# Patient Record
Sex: FEMALE | Race: WHITE | Employment: STUDENT | URBAN - NONMETROPOLITAN AREA
[De-identification: names, ages, dates, MRNs, and addresses within clinical notes are randomized per-mention and may not be internally consistent; named-entity substitution may affect disease eponyms.]

---
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
## 2024-06-04 ENCOUNTER — LAB ENCOUNTER (OUTPATIENT)
Dept: LAB | Age: 19
End: 2024-06-04
Attending: FAMILY MEDICINE
Payer: COMMERCIAL

## 2024-06-04 ENCOUNTER — OFFICE VISIT (OUTPATIENT)
Dept: FAMILY MEDICINE CLINIC | Facility: CLINIC | Age: 19
End: 2024-06-04
Payer: COMMERCIAL

## 2024-06-04 VITALS
RESPIRATION RATE: 16 BRPM | SYSTOLIC BLOOD PRESSURE: 110 MMHG | HEIGHT: 65.25 IN | HEART RATE: 80 BPM | TEMPERATURE: 98 F | DIASTOLIC BLOOD PRESSURE: 70 MMHG | BODY MASS INDEX: 23.04 KG/M2 | WEIGHT: 140 LBS | OXYGEN SATURATION: 98 %

## 2024-06-04 DIAGNOSIS — F41.1 GENERALIZED ANXIETY DISORDER: ICD-10-CM

## 2024-06-04 DIAGNOSIS — Z00.00 ROUTINE HEALTH MAINTENANCE: ICD-10-CM

## 2024-06-04 DIAGNOSIS — Z00.00 ROUTINE HEALTH MAINTENANCE: Primary | ICD-10-CM

## 2024-06-04 LAB
ALBUMIN SERPL-MCNC: 4.5 G/DL (ref 3.4–5)
ALBUMIN/GLOB SERPL: 1.6 {RATIO} (ref 1–2)
ALP LIVER SERPL-CCNC: 93 U/L
ALT SERPL-CCNC: 21 U/L
ANION GAP SERPL CALC-SCNC: 6 MMOL/L (ref 0–18)
AST SERPL-CCNC: 22 U/L (ref 15–37)
BASOPHILS # BLD AUTO: 0.05 X10(3) UL (ref 0–0.2)
BASOPHILS NFR BLD AUTO: 0.8 %
BILIRUB SERPL-MCNC: 0.9 MG/DL (ref 0.1–2)
BUN BLD-MCNC: 7 MG/DL (ref 9–23)
CALCIUM BLD-MCNC: 9.7 MG/DL (ref 8.5–10.1)
CHLORIDE SERPL-SCNC: 109 MMOL/L (ref 98–112)
CHOLEST SERPL-MCNC: 135 MG/DL (ref ?–200)
CO2 SERPL-SCNC: 25 MMOL/L (ref 21–32)
CREAT BLD-MCNC: 0.9 MG/DL
EGFRCR SERPLBLD CKD-EPI 2021: 94 ML/MIN/1.73M2 (ref 60–?)
EOSINOPHIL # BLD AUTO: 0.07 X10(3) UL (ref 0–0.7)
EOSINOPHIL NFR BLD AUTO: 1.2 %
ERYTHROCYTE [DISTWIDTH] IN BLOOD BY AUTOMATED COUNT: 11.9 %
FASTING PATIENT LIPID ANSWER: YES
FASTING STATUS PATIENT QL REPORTED: YES
GLOBULIN PLAS-MCNC: 2.8 G/DL (ref 2.8–4.4)
GLUCOSE BLD-MCNC: 83 MG/DL (ref 70–99)
HCT VFR BLD AUTO: 38 %
HDLC SERPL-MCNC: 55 MG/DL (ref 40–59)
HGB BLD-MCNC: 12.8 G/DL
IMM GRANULOCYTES # BLD AUTO: 0.02 X10(3) UL (ref 0–1)
IMM GRANULOCYTES NFR BLD: 0.3 %
LDLC SERPL CALC-MCNC: 69 MG/DL (ref ?–100)
LYMPHOCYTES # BLD AUTO: 1.6 X10(3) UL (ref 1.5–5)
LYMPHOCYTES NFR BLD AUTO: 26.7 %
MCH RBC QN AUTO: 31.4 PG (ref 26–34)
MCHC RBC AUTO-ENTMCNC: 33.7 G/DL (ref 31–37)
MCV RBC AUTO: 93.1 FL
MONOCYTES # BLD AUTO: 0.38 X10(3) UL (ref 0.1–1)
MONOCYTES NFR BLD AUTO: 6.3 %
NEUTROPHILS # BLD AUTO: 3.87 X10 (3) UL (ref 1.5–7.7)
NEUTROPHILS # BLD AUTO: 3.87 X10(3) UL (ref 1.5–7.7)
NEUTROPHILS NFR BLD AUTO: 64.7 %
NONHDLC SERPL-MCNC: 80 MG/DL (ref ?–130)
OSMOLALITY SERPL CALC.SUM OF ELEC: 287 MOSM/KG (ref 275–295)
PLATELET # BLD AUTO: 286 10(3)UL (ref 150–450)
POTASSIUM SERPL-SCNC: 3.8 MMOL/L (ref 3.5–5.1)
PROT SERPL-MCNC: 7.3 G/DL (ref 6.4–8.2)
RBC # BLD AUTO: 4.08 X10(6)UL
SODIUM SERPL-SCNC: 140 MMOL/L (ref 136–145)
T4 FREE SERPL-MCNC: 1 NG/DL (ref 0.9–1.6)
TRIGL SERPL-MCNC: 49 MG/DL (ref 30–149)
TSI SER-ACNC: 2.27 MIU/ML (ref 0.36–3.74)
VLDLC SERPL CALC-MCNC: 7 MG/DL (ref 0–30)
WBC # BLD AUTO: 6 X10(3) UL (ref 4–11)

## 2024-06-04 PROCEDURE — 85025 COMPLETE CBC W/AUTO DIFF WBC: CPT

## 2024-06-04 PROCEDURE — 36415 COLL VENOUS BLD VENIPUNCTURE: CPT

## 2024-06-04 PROCEDURE — 80053 COMPREHEN METABOLIC PANEL: CPT

## 2024-06-04 PROCEDURE — 84439 ASSAY OF FREE THYROXINE: CPT

## 2024-06-04 PROCEDURE — 80061 LIPID PANEL: CPT

## 2024-06-04 PROCEDURE — 99395 PREV VISIT EST AGE 18-39: CPT | Performed by: FAMILY MEDICINE

## 2024-06-04 PROCEDURE — 84443 ASSAY THYROID STIM HORMONE: CPT

## 2024-06-04 NOTE — PROGRESS NOTES
Subjective:   Shabnam Roy is a 18 year old female who was brought in for her Physical (Discuss anxiety) visit.    History was provided by patient   Pt c/o anxiety / ? Panic attacks  W/o depression  W/o concerns hurting self / others    History/Other:     She  has a past medical history of Anxiety.   She  has a past surgical history that includes other surgical history (N/A, 1/26/2016).  Her family history is not on file.  She currently has no medications in their medication list.    Chief Complaint Reviewed and Verified  Nursing Notes Reviewed and   Verified  Tobacco Reviewed  Medications Reviewed  Medical History   Reviewed  Surgical History Reviewed  Family History Reviewed  Social   History Reviewed               PHQ-2 SCORE: 0  , done 6/4/2024       TB Screening Needed? : No    Review of Systems  As documented in HPI    Child/teen diet: varied diet and drinks milk and water     Elimination: no concerns    Sleep: no concerns and sleeps well     Dental: normal for age    Development:  Current grade level:  College  School performance/Grades: doing well in school  Sports/Activities:  Counseled on targeting 60+ minutes of moderate (or higher) intensity activity daily  She  reports that she has never smoked. She has never used smokeless tobacco. She reports that she does not drink alcohol and does not use drugs.      Sexual activity: no    Periods reg / mod       Objective:   Blood pressure 110/70, pulse 80, temperature 98.2 °F (36.8 °C), temperature source Temporal, resp. rate 16, height 5' 5.25\" (1.657 m), weight 140 lb (63.5 kg), SpO2 98%.   BMI for age is 65.39%.  Physical Exam      Constitutional: appears well hydrated, alert and responsive, no acute distress noted  Head/Face: Normocephalic, atraumatic  Eye:Pupils equal, round, reactive to light, red reflex present bilaterally, and tracks symmetrically  Vision: screen not needed   Ears/Hearing: normal shape and position  ear canal and TM normal  bilaterally  Nose: nares normal, no discharge  Mouth/Throat: oropharynx is normal, mucus membranes are moist  no oral lesions or erythema  Neck/Thyroid: supple, no lymphadenopathy   Breast Exam : deferred   Respiratory: normal to inspection, clear to auscultation bilaterally   Cardiovascular: regular rate and rhythm, no murmur  Vascular: well perfused and peripheral pulses equal  Abdomen:non distended, normal bowel sounds, no hepatosplenomegaly, no masses  Genitourinary: deferred  Skin/Hair: no rash, no abnormal bruising  Back/Spine: no abnormalities and no scoliosis  Musculoskeletal: no deformities, full ROM of all extremities  Extremities: no deformities, pulses equal upper and lower extremities  Neurologic: exam appropriate for age, reflexes grossly normal for age, and motor skills grossly normal for age  Psychiatric: behavior appropriate for age    Assessment & Plan:   There are no diagnoses linked to this encounter.    Immunizations discussed, No vaccines ordered today.      Parental concerns and questions addressed.  Anticipatory guidance for nutrition/diet, exercise/physical activity, safety and development discussed and reviewed.  Brian Developmental Handout provided  Counseling : healthy diet with adequate calcium, seat belt use, firearm protection, establish rules and privileges, limit and supervise TV/Video games/computer, puberty, encourage hobbies , physical activity targeting 60+ minutes daily, adequate sleep and exercise, three meals a day, nutritious snacks, brush teeth, body changes, cigarettes, alcohol, drugs, and how to say no, abstinence       No follow-ups on file.

## 2024-12-24 ENCOUNTER — TELEPHONE (OUTPATIENT)
Dept: FAMILY MEDICINE CLINIC | Facility: CLINIC | Age: 19
End: 2024-12-24

## 2025-04-29 ENCOUNTER — TELEPHONE (OUTPATIENT)
Dept: FAMILY MEDICINE CLINIC | Facility: CLINIC | Age: 20
End: 2025-04-29

## (undated) NOTE — LETTER
VACCINE ADMINISTRATION RECORD  PARENT / GUARDIAN APPROVAL  Date: 2022  Vaccine administered to: Shabnam Roy     : 2005    MRN: [id number]    A copy of the appropriate Centers for Disease Control and Prevention Vaccine Information statement has been provided. I have read or have had explained the information about the diseases and the vaccines listed below. There was an opportunity to ask questions and any questions were answered satisfactorily. I believe that I understand the benefits and risks of the vaccine cited and ask that the vaccine(s) listed below be given to me or to the person named above (for whom I am authorized to make this request). VACCINES ADMINISTERED:  Gardasil    I have read and hereby agree to be bound by the terms of this agreement as stated above. My signature is valid until revoked by me in writing. This document is signed by Mother, relationship: Mother on 2022.:                                                                                                                                         Parent / Erica Lawn                                                Date    Yany Arcos MA served as a witness to authentication that the identity of the person signing electronically is in fact the person represented as signing. This document was generated by Yany Arcos MA on 2022.

## (undated) NOTE — LETTER
[facility] [employer] of Child Health Examination       Student's Name  Shaun Estella Birth Date Title                           Date     Signature HEALTH HISTORY          TO BE COMPLETED AND SIGNED BY PARENT/GUARDIAN AND VERIFIED BY HEALTH CARE PROVIDER    ALLERGIES  (Food, drug, insect, other)  Patient has no known allergies.  MEDICATION  (List all prescribed or taken on a regular basis.)    Current adult)   Pulse 76   Temp 98.4 °F (36.9 °C) (Temporal)   Resp 16   Ht 64\"   Wt 127 lb   LMP 07/18/2019   BMI 21.80 kg/m²     DIABETES SCREENING  BMI>85% age/sex  No And any two of the following:  Family History No   Ethnic Minority  No          Signs of In Respiratory Yes                   Diagnosis of Asthma: No Mental Health Yes        Currently Prescribed Asthma Medication:            Quick-relief  medication (e.g. Short Acting Beta Antagonist): No          Controller medication (e.g. inhaled corticostero

## (undated) NOTE — LETTER
Name:  Parry Kocher School Year:   Class:  7th Grade Student ID No.:   Address:  [facility] [address] Phone:  [phone] (home)  :  14year old   Name Relationship [address] Work Phone Home Phone Mobile Phone   1. Yosvany syndrome, arrhythmogenic right ventricular cardiomyopathy, long QT syndrome, short QT syndrome, Brugada syndrome, or catecholaminergic polymorphic ventricular tachycardia? No   15.  Does anyone in your family have a heart problem, pacemaker, or implanted de 35. Have you ever had a hit or blow to the head that caused confusion, prolonged headache, or memory problems? No   36. Do you have a history of seizure disorder? No   37. Do you have headaches with exercise? No   38.  Have you ever had numbness, tingling, [facility] 2-20 Years BMI-for-age data using vitals from 1/8/2018. female    Vision: R 20/25          L 20/25          BOTH 20/13          Uncorrected   MEDICAL NORMAL ABNORMAL FINDINGS   Appearance:  Marfan stigmata (kyphoscoliosis, high-arched palate, pectus ex (This section for high school students only)   4054-4087 school term    As a prerequisite to participation in [employer] athletic activities, we agree that I/our student will not use performance-enhancing substances as defined in the [employer] Performance-Enhancing S

## (undated) NOTE — LETTER
VACCINE ADMINISTRATION RECORD  PARENT / GUARDIAN APPROVAL  Date: 2022  Vaccine administered to: Shabnam Roy     : 2005    MRN: [id number]    A copy of the appropriate Centers for Disease Control and Prevention Vaccine Information statement has been provided. I have read or have had explained the information about the diseases and the vaccines listed below. There was an opportunity to ask questions and any questions were answered satisfactorily. I believe that I understand the benefits and risks of the vaccine cited and ask that the vaccine(s) listed below be given to me or to the person named above (for whom I am authorized to make this request). VACCINES ADMINISTERED:    Hpv Virus Vaccine 9 Jazz Im    I have read and hereby agree to be bound by the terms of this agreement as stated above. My signature is valid until revoked by me in writing. This document is signed by MOTHER , relationship:  MOTHER  on 2022.:                                                                                                                                         Parent / Enrique Casarezril Signature                                                Date    Zina Antony MA served as a witness to authentication that the identity of the person signing electronically is in fact the person represented as signing. This document was generated by Zina Antony MA on 2022.

## (undated) NOTE — LETTER
VACCINE ADMINISTRATION RECORD  PARENT / GUARDIAN APPROVAL  Date: 3/4/2023  Vaccine administered to: Shabnam Roy     : 2005    MRN: [id number]    A copy of the appropriate Centers for Disease Control and Prevention Vaccine Information statement has been provided. I have read or have had explained the information about the diseases and the vaccines listed below. There was an opportunity to ask questions and any questions were answered satisfactorily. I believe that I understand the benefits and risks of the vaccine cited and ask that the vaccine(s) listed below be given to me or to the person named above (for whom I am authorized to make this request). VACCINES ADMINISTERED:  HPV (Gardasil 9)    I have read and hereby agree to be bound by the terms of this agreement as stated above. My signature is valid until revoked by me in writing. This document is signed by, relationship:  Mother  on 3/4/2023.:                                                                                                                                         Parent / Georgia Morris                                                Date    Cheri Grullon served as a witness to authentication that the identity of the person signing electronically is in fact the person represented as signing. This document was generated by Adina Tariq MA on 3/4/2023.